# Patient Record
Sex: FEMALE | Race: OTHER | URBAN - METROPOLITAN AREA
[De-identification: names, ages, dates, MRNs, and addresses within clinical notes are randomized per-mention and may not be internally consistent; named-entity substitution may affect disease eponyms.]

---

## 2017-10-14 ENCOUNTER — EMERGENCY (EMERGENCY)
Facility: HOSPITAL | Age: 57
LOS: 1 days | Discharge: HOME | End: 2017-10-14
Attending: EMERGENCY MEDICINE | Admitting: EMERGENCY MEDICINE
Payer: MEDICAID

## 2017-10-14 VITALS
WEIGHT: 190.04 LBS | OXYGEN SATURATION: 97 % | TEMPERATURE: 98 F | RESPIRATION RATE: 16 BRPM | SYSTOLIC BLOOD PRESSURE: 142 MMHG | DIASTOLIC BLOOD PRESSURE: 94 MMHG | HEART RATE: 79 BPM | HEIGHT: 60 IN

## 2017-10-14 LAB
ALBUMIN SERPL ELPH-MCNC: 4.2 G/DL — SIGNIFICANT CHANGE UP (ref 3.3–5)
ALP SERPL-CCNC: 70 U/L — SIGNIFICANT CHANGE UP (ref 40–120)
ALT FLD-CCNC: 26 U/L — SIGNIFICANT CHANGE UP (ref 4–33)
AST SERPL-CCNC: 21 U/L — SIGNIFICANT CHANGE UP (ref 4–32)
BASE EXCESS BLDV CALC-SCNC: 1.2 MMOL/L — SIGNIFICANT CHANGE UP
BASOPHILS # BLD AUTO: 0.02 K/UL — SIGNIFICANT CHANGE UP (ref 0–0.2)
BASOPHILS NFR BLD AUTO: 0.3 % — SIGNIFICANT CHANGE UP (ref 0–2)
BILIRUB SERPL-MCNC: 0.2 MG/DL — SIGNIFICANT CHANGE UP (ref 0.2–1.2)
BLOOD GAS VENOUS - CREATININE: 0.75 MG/DL — SIGNIFICANT CHANGE UP (ref 0.5–1.3)
BUN SERPL-MCNC: 13 MG/DL — SIGNIFICANT CHANGE UP (ref 7–23)
CALCIUM SERPL-MCNC: 8.6 MG/DL — SIGNIFICANT CHANGE UP (ref 8.4–10.5)
CHLORIDE BLDV-SCNC: 107 MMOL/L — SIGNIFICANT CHANGE UP (ref 96–108)
CHLORIDE SERPL-SCNC: 110 MMOL/L — HIGH (ref 98–107)
CK SERPL-CCNC: 88 U/L — SIGNIFICANT CHANGE UP (ref 25–170)
CO2 SERPL-SCNC: 23 MMOL/L — SIGNIFICANT CHANGE UP (ref 22–31)
CREAT SERPL-MCNC: 0.89 MG/DL — SIGNIFICANT CHANGE UP (ref 0.5–1.3)
D DIMER BLD IA.RAPID-MCNC: 177 NG/ML — SIGNIFICANT CHANGE UP
EOSINOPHIL # BLD AUTO: 0.08 K/UL — SIGNIFICANT CHANGE UP (ref 0–0.5)
EOSINOPHIL NFR BLD AUTO: 1.4 % — SIGNIFICANT CHANGE UP (ref 0–6)
GAS PNL BLDV: 136 MMOL/L — SIGNIFICANT CHANGE UP (ref 136–146)
GLUCOSE BLDV-MCNC: 135 — HIGH (ref 70–99)
GLUCOSE SERPL-MCNC: 133 MG/DL — HIGH (ref 70–99)
HCO3 BLDV-SCNC: 24 MMOL/L — SIGNIFICANT CHANGE UP (ref 20–27)
HCT VFR BLD CALC: 42.1 % — SIGNIFICANT CHANGE UP (ref 34.5–45)
HCT VFR BLDV CALC: 43.8 % — SIGNIFICANT CHANGE UP (ref 34.5–45)
HGB BLD-MCNC: 13.6 G/DL — SIGNIFICANT CHANGE UP (ref 11.5–15.5)
HGB BLDV-MCNC: 14.3 G/DL — SIGNIFICANT CHANGE UP (ref 11.5–15.5)
IMM GRANULOCYTES # BLD AUTO: 0.01 # — SIGNIFICANT CHANGE UP
IMM GRANULOCYTES NFR BLD AUTO: 0.2 % — SIGNIFICANT CHANGE UP (ref 0–1.5)
LACTATE BLDV-MCNC: 1.8 MMOL/L — SIGNIFICANT CHANGE UP (ref 0.5–2)
LYMPHOCYTES # BLD AUTO: 2.08 K/UL — SIGNIFICANT CHANGE UP (ref 1–3.3)
LYMPHOCYTES # BLD AUTO: 35.6 % — SIGNIFICANT CHANGE UP (ref 13–44)
MCHC RBC-ENTMCNC: 29.1 PG — SIGNIFICANT CHANGE UP (ref 27–34)
MCHC RBC-ENTMCNC: 32.3 % — SIGNIFICANT CHANGE UP (ref 32–36)
MCV RBC AUTO: 90 FL — SIGNIFICANT CHANGE UP (ref 80–100)
MONOCYTES # BLD AUTO: 0.51 K/UL — SIGNIFICANT CHANGE UP (ref 0–0.9)
MONOCYTES NFR BLD AUTO: 8.7 % — SIGNIFICANT CHANGE UP (ref 2–14)
NEUTROPHILS # BLD AUTO: 3.15 K/UL — SIGNIFICANT CHANGE UP (ref 1.8–7.4)
NEUTROPHILS NFR BLD AUTO: 53.8 % — SIGNIFICANT CHANGE UP (ref 43–77)
NRBC # FLD: 0 — SIGNIFICANT CHANGE UP
NT-PROBNP SERPL-SCNC: 20.95 PG/ML — SIGNIFICANT CHANGE UP
PCO2 BLDV: 48 MMHG — SIGNIFICANT CHANGE UP (ref 41–51)
PH BLDV: 7.35 PH — SIGNIFICANT CHANGE UP (ref 7.32–7.43)
PLATELET # BLD AUTO: 219 K/UL — SIGNIFICANT CHANGE UP (ref 150–400)
PMV BLD: 10.2 FL — SIGNIFICANT CHANGE UP (ref 7–13)
PO2 BLDV: 33 MMHG — LOW (ref 35–40)
POTASSIUM BLDV-SCNC: 5.6 MMOL/L — HIGH (ref 3.4–4.5)
POTASSIUM SERPL-MCNC: 4.7 MMOL/L — SIGNIFICANT CHANGE UP (ref 3.5–5.3)
POTASSIUM SERPL-SCNC: 4.7 MMOL/L — SIGNIFICANT CHANGE UP (ref 3.5–5.3)
PROT SERPL-MCNC: 7.4 G/DL — SIGNIFICANT CHANGE UP (ref 6–8.3)
RBC # BLD: 4.68 M/UL — SIGNIFICANT CHANGE UP (ref 3.8–5.2)
RBC # FLD: 13.1 % — SIGNIFICANT CHANGE UP (ref 10.3–14.5)
SAO2 % BLDV: 60.5 % — SIGNIFICANT CHANGE UP (ref 60–85)
SODIUM SERPL-SCNC: 148 MMOL/L — HIGH (ref 135–145)
TROPONIN T SERPL-MCNC: < 0.06 NG/ML — SIGNIFICANT CHANGE UP (ref 0–0.06)
WBC # BLD: 5.85 K/UL — SIGNIFICANT CHANGE UP (ref 3.8–10.5)
WBC # FLD AUTO: 5.85 K/UL — SIGNIFICANT CHANGE UP (ref 3.8–10.5)

## 2017-10-14 PROCEDURE — 93970 EXTREMITY STUDY: CPT | Mod: 26

## 2017-10-14 PROCEDURE — 99220: CPT

## 2017-10-14 PROCEDURE — 71020: CPT | Mod: 26

## 2017-10-14 RX ORDER — ACETAMINOPHEN 500 MG
650 TABLET ORAL ONCE
Qty: 0 | Refills: 0 | Status: COMPLETED | OUTPATIENT
Start: 2017-10-14 | End: 2017-10-14

## 2017-10-14 RX ORDER — SODIUM CHLORIDE 9 MG/ML
1000 INJECTION INTRAMUSCULAR; INTRAVENOUS; SUBCUTANEOUS ONCE
Qty: 0 | Refills: 0 | Status: COMPLETED | OUTPATIENT
Start: 2017-10-14 | End: 2017-10-14

## 2017-10-14 RX ORDER — FAMOTIDINE 10 MG/ML
20 INJECTION INTRAVENOUS ONCE
Qty: 0 | Refills: 0 | Status: COMPLETED | OUTPATIENT
Start: 2017-10-14 | End: 2017-10-14

## 2017-10-14 RX ORDER — ASPIRIN/CALCIUM CARB/MAGNESIUM 324 MG
81 TABLET ORAL DAILY
Qty: 0 | Refills: 0 | Status: DISCONTINUED | OUTPATIENT
Start: 2017-10-14 | End: 2017-10-18

## 2017-10-14 RX ADMIN — SODIUM CHLORIDE 1000 MILLILITER(S): 9 INJECTION INTRAMUSCULAR; INTRAVENOUS; SUBCUTANEOUS at 21:17

## 2017-10-14 RX ADMIN — FAMOTIDINE 20 MILLIGRAM(S): 10 INJECTION INTRAVENOUS at 18:00

## 2017-10-14 RX ADMIN — Medication 81 MILLIGRAM(S): at 21:17

## 2017-10-14 RX ADMIN — Medication 30 MILLILITER(S): at 18:00

## 2017-10-14 RX ADMIN — Medication 650 MILLIGRAM(S): at 23:33

## 2017-10-14 NOTE — ED ADULT NURSE NOTE - OBJECTIVE STATEMENT
Pt received in #10, aaox3 with c/o abd pain x 1 month, right calf pain x 1 week and chest pain with nausea x 1 day. States she returns from [facility], 5 hour flight, 3 days ago. Abd pain is generalized all over but denies vomiting and diarrhea. Denies swelling of the lower extremities, numbness, coldness of extremity of hx of dvt. Chest pain is sharp, constant, without any precipitating or alleviating factors.

## 2017-10-14 NOTE — ED PROVIDER NOTE - PSH
Abdominal hernia  1995- abdominal hernia repair  Abdominal mass  1995- abdominal mass removal  Breast enlargement  2009- breast reduction  Breast mass in female  1967- right breast lumpectomy  Fibroids  1999- hysterectomy  History of appendectomy  1962  History of cholecystectomy    Obesity  1996- tummy tuck

## 2017-10-14 NOTE — ED PROVIDER NOTE - OBJECTIVE STATEMENT
55 yo female c pmhx DM, HLD, HTN, IBS presents to ED c/o dry mouth, b/l calf pain and arm pain x 1 week, generalized weakness and an episode of chest tightness today which last 2 min. Pt states was arguing with her  when she felt a squeezing chest pain radiating to her back. Also c/o epigastric discomfort, occasional numbness to left side of her face.  Has been having intermittent nausea and diarrhea due to her IBS. Pt just came back to [facility] 3 days ago. Denies any sob, fever, chills, vomiting.

## 2017-10-14 NOTE — ED ADULT NURSE NOTE - CHIEF COMPLAINT QUOTE
pt c/o generalized weakness, bilateral leg pain, chills and dry mouth x 1 week. Pt c/o mid sternal chest pain radiating into back since this afternoon. [facility]. Hx DM type 2 , HTN, HLD. WW=[id number]

## 2017-10-14 NOTE — ED PROVIDER NOTE - ATTENDING CONTRIBUTION TO CARE
Seen and examined, c/o episodic chest tightness, today more severe with rad. to back and both arms. Denies assoc. SOB/diaphoresis, states occ. nausea, no vomiting, but not always assoc. with chest c/o. Clear lungs, NT CW, heart reg, no murmur, soft, NT abd, no edema, NT calves. No c/o CP at time of exam.

## 2017-10-14 NOTE — ED PROVIDER NOTE - PMH
Cholecystitis    Diabetes mellitus, type 2    GERD (gastroesophageal reflux disease)    History of hypertension    Kidney stones  20014- right kidney  Pancreatitis  three times in 2014

## 2017-10-14 NOTE — ED PROVIDER NOTE - FAMILY HISTORY
Aunt  Still living? No  Family history of pancreatic cancer, Age at diagnosis: Age Unknown     Father  Still living? Unknown  Family history of lymphoma, Age at diagnosis: Age Unknown

## 2017-10-14 NOTE — ED ADULT TRIAGE NOTE - CHIEF COMPLAINT QUOTE
pt c/o generalized weakness, bilateral leg pain, chills and dry mouth x 1 week. Pt c/o mid sternal chest pain radiating into back since this afternoon. [facility]. Hx DM type 2 , HTN, HLD. [id number]

## 2017-10-15 VITALS
HEART RATE: 70 BPM | TEMPERATURE: 99 F | SYSTOLIC BLOOD PRESSURE: 118 MMHG | DIASTOLIC BLOOD PRESSURE: 69 MMHG | OXYGEN SATURATION: 97 % | RESPIRATION RATE: 16 BRPM

## 2017-10-15 LAB
CK MB BLD-MCNC: 1 NG/ML — SIGNIFICANT CHANGE UP (ref 1–4.7)
CK SERPL-CCNC: 62 U/L — SIGNIFICANT CHANGE UP (ref 25–170)
HBA1C BLD-MCNC: 6.5 % — HIGH (ref 4–5.6)
TROPONIN T SERPL-MCNC: < 0.06 NG/ML — SIGNIFICANT CHANGE UP (ref 0–0.06)

## 2017-10-15 PROCEDURE — 93016 CV STRESS TEST SUPVJ ONLY: CPT | Mod: GC

## 2017-10-15 PROCEDURE — 93018 CV STRESS TEST I&R ONLY: CPT | Mod: GC

## 2017-10-15 PROCEDURE — 99217: CPT

## 2017-10-15 RX ORDER — METFORMIN HYDROCHLORIDE 850 MG/1
500 TABLET ORAL
Qty: 0 | Refills: 0 | Status: DISCONTINUED | OUTPATIENT
Start: 2017-10-15 | End: 2017-10-18

## 2017-10-15 RX ORDER — LISINOPRIL 2.5 MG/1
10 TABLET ORAL DAILY
Qty: 0 | Refills: 0 | Status: DISCONTINUED | OUTPATIENT
Start: 2017-10-15 | End: 2017-10-18

## 2017-10-15 RX ADMIN — METFORMIN HYDROCHLORIDE 500 MILLIGRAM(S): 850 TABLET ORAL at 10:43

## 2017-10-15 RX ADMIN — LISINOPRIL 10 MILLIGRAM(S): 2.5 TABLET ORAL at 06:25

## 2017-10-15 RX ADMIN — Medication 81 MILLIGRAM(S): at 13:46

## 2017-10-15 RX ADMIN — Medication 650 MILLIGRAM(S): at 00:30

## 2017-10-15 NOTE — ED CDU PROVIDER NOTE - MEDICAL DECISION MAKING DETAILS
Pt is a 55 y/o F former smoker PMHx DM, HTN, HLD, s/p cholecystectomy, s/p appendectomy, s/p hysterectomy p/w chest pain today -- r/o acs -- repeat cardiac enzymes, stress, telemetry

## 2017-10-15 NOTE — ED CDU PROVIDER NOTE - OBJECTIVE STATEMENT
Pt is a 55 y/o F former smoker PMHx DM, HTN, HLD, s/p cholecystectomy, s/p appendectomy, s/p hysterectomy p/w chest pain today.  Pt states she had sudden onset left parasternal sharp, squeezing chest pain which radiated to the back, lasting for 2 minutes while having an argument with her .  Pt states after 2 minutes, pain completely resolved.  Pt also notes intermittent epigastric burning pain associated with intermittent diarrhea and nausea (which pt attributes to IBS) for 2 months associated with bilateral upper arm pain as well as intermittent right calf pain for the past 1 week.  Pt states she had a stress test ~ 1 year ago with Dr. Lazo."  Pt denies any fevers, chills, SOB, palpitations, syncope, jaw pain, h/o dvt/pe in past, LE edema, ocp use, h/o malignancy, any recent surgeries.  In Main ED, pt with negative troponin and negative D-dimer.  Pt sent to CDU for telemetry, serial cardiac enzymes, and stress test.

## 2017-10-15 NOTE — ED CDU PROVIDER NOTE - PLAN OF CARE
Follow up with your primary care provider and/or cardiologist within 48-72 hours. Call [facility] Find a doctor at [phone] to find an provider who takes your insurance or you can see your cardiologist. Show copies of your reports given to you. Recommend Aspirin 81mg over the counter daily until further evaluation.  Take all of your other medications as previously prescribed. Worsening or continued chest pain, shortness of breath, weakness, return to ED.

## 2017-10-15 NOTE — ED CDU PROVIDER NOTE - PROGRESS NOTE DETAILS
CDU VIRAJ Campbell: Pt resting comfortably, NAD. VSS- on tele: NSR. No active chest pain, SOB, palpitations.  CE#2 negative. Will continue to monitor and observe- stress test in AM. CDU Att PN: Finn  No further symptoms.  Feeling well.  Stress test pending.  I have personally performed a face to face evaluation of this patient including review of the history and focused physical exam.  I have discussed the case and reviewed the plan of care with the PA. Lana: spoke with nuclear and pt was able to give Dr. Castro phone number. Called to speak with  because pt states she had a nuclear stress test 5 months ago and it was normal. Called [phone] and Dr. Reyes is not a doctor in  that office and the patient is not a patient in that office. Will do plain EST for pt CDU VIRAJ Schwartz: Patient laying comfortably in bed NAD, No complaints. Reviewed discharge instructions for discharge. All pts questions answered. CDU Att DC Note: Finn  Pt feeling well.  Results discussed and provided.  Interested in DC.  To f/u c PMD.   I have personally performed a face to face evaluation of this patient including review of the history and focused physical exam.  I have discussed the case and reviewed the plan of care with the PA.

## 2017-10-20 NOTE — ED CDU PROVIDER INITIAL DAY NOTE - MEDICAL DECISION MAKING DETAILS
57yo with initial EKG and troponin neg., hx of sx with stress but occ. with exertion, was to have stress from PMD but had not followed up. Sent to CDU for serial enzymes, re-eval, stress in a.m.

## 2017-10-20 NOTE — ED CDU PROVIDER INITIAL DAY NOTE - OBJECTIVE STATEMENT
57 yo female c pmhx DM, HLD, HTN, IBS presents to ED c/o dry mouth, b/l calf pain and arm pain x 1 week, generalized weakness and an episode of chest tightness today which last 2 min. Pt states was arguing with her  when she felt a squeezing chest pain radiating to her back. Also c/o epigastric discomfort, occasional numbness to left side of her face.  Has been having intermittent nausea and diarrhea due to her IBS. Pt just came back to [facility] 3 days ago. Denies any sob, fever, chills, vomiting.

## 2017-10-20 NOTE — ED CDU PROVIDER INITIAL DAY NOTE - ATTENDING CONTRIBUTION TO CARE
Seen and examined, have discussed plan of care with PA. I agree with note as stated above, having amended the EMR as needed to reflect the findings.

## 2018-01-17 ENCOUNTER — APPOINTMENT (OUTPATIENT)
Dept: SPINE | Facility: CLINIC | Age: 58
End: 2018-01-17
Payer: MEDICAID

## 2018-01-17 VITALS
WEIGHT: 194 LBS | HEART RATE: 74 BPM | SYSTOLIC BLOOD PRESSURE: 123 MMHG | DIASTOLIC BLOOD PRESSURE: 83 MMHG | BODY MASS INDEX: 38.09 KG/M2 | HEIGHT: 60 IN

## 2018-01-17 DIAGNOSIS — M51.26 OTHER INTERVERTEBRAL DISC DISPLACEMENT, LUMBAR REGION: ICD-10-CM

## 2018-01-17 DIAGNOSIS — M50.20 OTHER CERVICAL DISC DISPLACEMENT, UNSPECIFIED CERVICAL REGION: ICD-10-CM

## 2018-01-17 PROCEDURE — 99204 OFFICE O/P NEW MOD 45 MIN: CPT

## 2018-01-31 ENCOUNTER — APPOINTMENT (OUTPATIENT)
Dept: SPINE | Facility: CLINIC | Age: 58
End: 2018-01-31

## 2018-02-28 ENCOUNTER — APPOINTMENT (OUTPATIENT)
Dept: SPINE | Facility: CLINIC | Age: 58
End: 2018-02-28

## 2018-08-11 NOTE — ED ADULT NURSE NOTE - FALL HARM RISK TYPE OF ASSESSMENT
Admission conducted a detailed discussion... I had a detailed discussion with the patient and/or guardian regarding the historical points, exam findings, and any diagnostic results supporting the discharge/admit diagnosis.

## 2018-10-30 NOTE — ED CDU PROVIDER NOTE - NEURO NEGATIVE STATEMENT, MLM
no loss of consciousness, no gait abnormality, no headache, no sensory deficits, and no weakness. verbal cues/fair eccentric control upon descent

## 2025-04-15 NOTE — ED CDU PROVIDER NOTE - EYES, MLM
Please schedule in Ortho with Xray prior thanks!    Clear bilaterally, pupils equal, round and reactive to light.